# Patient Record
Sex: MALE | Race: WHITE | URBAN - METROPOLITAN AREA
[De-identification: names, ages, dates, MRNs, and addresses within clinical notes are randomized per-mention and may not be internally consistent; named-entity substitution may affect disease eponyms.]

---

## 2024-06-15 ENCOUNTER — OFFICE VISIT (OUTPATIENT)
Age: 74
End: 2024-06-15

## 2024-06-15 VITALS
BODY MASS INDEX: 27.48 KG/M2 | SYSTOLIC BLOOD PRESSURE: 129 MMHG | DIASTOLIC BLOOD PRESSURE: 71 MMHG | OXYGEN SATURATION: 90 % | TEMPERATURE: 98.3 F | WEIGHT: 171 LBS | HEART RATE: 90 BPM | HEIGHT: 66 IN

## 2024-06-15 DIAGNOSIS — J01.90 ACUTE VIRAL SINUSITIS: Primary | ICD-10-CM

## 2024-06-15 DIAGNOSIS — B97.89 ACUTE VIRAL SINUSITIS: Primary | ICD-10-CM

## 2024-06-15 DIAGNOSIS — J40 BRONCHITIS: ICD-10-CM

## 2024-06-15 RX ORDER — METHADONE HYDROCHLORIDE 10 MG/1
10 TABLET ORAL EVERY 4 HOURS PRN
COMMUNITY

## 2024-06-15 RX ORDER — ROSUVASTATIN CALCIUM 40 MG/1
40 TABLET, COATED ORAL EVERY EVENING
COMMUNITY

## 2024-06-15 RX ORDER — LISINOPRIL 5 MG/1
5 TABLET ORAL DAILY
COMMUNITY

## 2024-06-15 RX ORDER — EZETIMIBE 10 MG/1
10 TABLET ORAL DAILY
COMMUNITY

## 2024-06-15 RX ORDER — FLUTICASONE PROPIONATE 50 MCG
2 SPRAY, SUSPENSION (ML) NASAL DAILY
Qty: 16 G | Refills: 0 | Status: SHIPPED | OUTPATIENT
Start: 2024-06-15

## 2024-06-15 RX ORDER — METOPROLOL SUCCINATE 50 MG/1
50 TABLET, EXTENDED RELEASE ORAL DAILY
COMMUNITY

## 2024-06-15 RX ORDER — ASPIRIN 81 MG/1
81 TABLET, CHEWABLE ORAL DAILY
COMMUNITY

## 2024-06-15 RX ORDER — ESOMEPRAZOLE MAGNESIUM 20 MG/1
20 GRANULE, DELAYED RELEASE ORAL DAILY
COMMUNITY

## 2024-06-15 RX ORDER — METHYLPREDNISOLONE 4 MG/1
TABLET ORAL
Qty: 1 KIT | Refills: 0 | Status: SHIPPED | OUTPATIENT
Start: 2024-06-15 | End: 2024-06-21

## 2024-06-15 RX ORDER — AMLODIPINE BESYLATE 10 MG/1
10 TABLET ORAL DAILY
COMMUNITY

## 2024-06-15 RX ORDER — NITROGLYCERIN 0.1MG/HR
1 PATCH, TRANSDERMAL 24 HOURS TRANSDERMAL DAILY
COMMUNITY

## 2024-06-15 NOTE — PROGRESS NOTES
Dennys Benoit (:  1950) is a 74 y.o. male,New patient, here for evaluation of the following chief complaint(s):  Cough (Productive cough x1 days)      Assessment & Plan :  Visit Diagnoses and Associated Orders       Acute viral sinusitis    -  Primary    fluticasone (FLONASE) 50 MCG/ACT nasal spray [60776]      methylPREDNISolone (MEDROL DOSEPACK) 4 MG tablet [4991]           Bronchitis        Dextromethorphan-guaiFENesin  MG/5ML SYRP [034583]      methylPREDNISolone (MEDROL DOSEPACK) 4 MG tablet [4991]           ORDERS WITHOUT AN ASSOCIATED DIAGNOSIS    aspirin 81 MG chewable tablet [680]      rosuvastatin (CRESTOR) 40 MG tablet [66452]      ezetimibe (ZETIA) 10 MG tablet [70794]      metoprolol succinate (TOPROL XL) 50 MG extended release tablet [10883]      amLODIPine (NORVASC) 10 MG tablet [69]      lisinopril (PRINIVIL;ZESTRIL) 5 MG tablet [57553]      esomeprazole Magnesium (NEXIUM) 20 MG PACK [20644]      methadone (DOLOPHINE) 10 MG tablet [0543]      TESTOSTERONE CYPIONATE IJ [873713]      nitroGLYCERIN (NITRODUR) 0.1 MG/HR [61832]             seek immediate medical care if:    You have new or worse trouble breathing.     You cough up dark brown or bloody mucus (sputum).     You have a new or higher fever.     You have a new rash.       Subjective :  Cough (Green Productive cough, runny nose and ST x1 days)  Throat is mildly red and irritated.        History provided by:  Patient    HPI:   74 y.o. male presents with symptoms of viral sinusitis and bronchitis         Vitals:    06/15/24 1313   BP: 129/71   Site: Right Upper Arm   Position: Sitting   Cuff Size: Large Adult   Pulse: 90   Temp: 98.3 °F (36.8 °C)   TempSrc: Oral   SpO2: 90%   Weight: 77.6 kg (171 lb)   Height: 1.676 m (5' 6\")          Objective   Physical Exam  Constitutional:       General: He is not in acute distress.     Appearance: Normal appearance.   HENT:      Right Ear: External ear normal.      Left Ear: External ear

## 2024-06-15 NOTE — PATIENT INSTRUCTIONS
Discharge medications reviewed with the patient and appropriate educational materials and side effects teaching were provided.    seek immediate medical care if:    You have new or worse trouble breathing.     You cough up dark brown or bloody mucus (sputum).     You have a new or higher fever.     You have a new rash.

## 2025-06-01 ENCOUNTER — OFFICE VISIT (OUTPATIENT)
Age: 75
End: 2025-06-01

## 2025-06-01 VITALS
DIASTOLIC BLOOD PRESSURE: 84 MMHG | WEIGHT: 177 LBS | OXYGEN SATURATION: 98 % | HEIGHT: 65 IN | HEART RATE: 87 BPM | SYSTOLIC BLOOD PRESSURE: 142 MMHG | BODY MASS INDEX: 29.49 KG/M2 | TEMPERATURE: 97.6 F

## 2025-06-01 DIAGNOSIS — I10 HYPERTENSION, UNSPECIFIED TYPE: ICD-10-CM

## 2025-06-01 DIAGNOSIS — J20.9 ACUTE BRONCHITIS, UNSPECIFIED ORGANISM: Primary | ICD-10-CM

## 2025-06-01 RX ORDER — DEXTROMETHORPHAN HYDROBROMIDE AND PROMETHAZINE HYDROCHLORIDE 15; 6.25 MG/5ML; MG/5ML
5 SYRUP ORAL 4 TIMES DAILY PRN
Qty: 120 ML | Refills: 0 | Status: SHIPPED | OUTPATIENT
Start: 2025-06-01 | End: 2025-06-08

## 2025-06-01 RX ORDER — PREDNISONE 20 MG/1
20 TABLET ORAL 2 TIMES DAILY
Qty: 10 TABLET | Refills: 0 | Status: SHIPPED | OUTPATIENT
Start: 2025-06-01 | End: 2025-06-06

## 2025-06-01 RX ORDER — AZITHROMYCIN 250 MG/1
250 TABLET, FILM COATED ORAL SEE ADMIN INSTRUCTIONS
Qty: 6 TABLET | Refills: 0 | Status: SHIPPED | OUTPATIENT
Start: 2025-06-01 | End: 2025-06-06